# Patient Record
Sex: MALE | Race: OTHER | HISPANIC OR LATINO | URBAN - METROPOLITAN AREA
[De-identification: names, ages, dates, MRNs, and addresses within clinical notes are randomized per-mention and may not be internally consistent; named-entity substitution may affect disease eponyms.]

---

## 2019-09-03 VITALS — WEIGHT: 180.34 LBS | HEIGHT: 69 IN

## 2019-09-03 NOTE — H&P ADULT - NSHPREVIEWOFSYSTEMS_GEN_ALL_CORE
REVIEW OF SYSTEMS      General:	no complaints    Respiratory and Thorax: +SOLER  	  Cardiovascular: +LOC, denies CP, palpitations    Gastrointestinal:	no abd pain    Genitourinary:	no changes in urination    Musculoskeletal:	no myalgias, no weakness    Neurological:    +LOC    Hematology/Lymphatics:	no abnormal bleeding

## 2019-09-03 NOTE — H&P ADULT - ASSESSMENT
59 y/o Welsh SPEAKING male Resistant Historian with PMHx of HTN and HLD who presented to cardiologist Dr. Sebastian c/o LOC, subsequently underwent NST which showed inferior wall perfusion defect, no presenting  for cardiac catheterization with possible intervention if clinically indicated to r/o CAD.

## 2019-09-03 NOTE — H&P ADULT - HISTORY OF PRESENT ILLNESS
Confirm meds    Patient is a 57 y/o Citizen of [place] SPEAKING male with PMHx of HTN and HLD who presented to cardiologist Dr. Sebastian c/o SOLER    Patient denies CP, SOB, palpitations, syncope, PND/orthopnea or LE edema.    NST 08/09/19: EF 58%, moderate sized, moderate intensity partially reversible defect in the inferior wall suggestive of ischemia, frequent PVCs noted during stress test. Echo 06/17/19: EF 55-60% trace amount of AR    In light of pt's risk factors, CCS -- anginal symptoms, and abnormal stress test patient is referred for cardiac catheterization with possible intervention if clinically indicated to r/o CAD SKELETON  Confirm meds    Patient is a 59 y/o Azeri SPEAKING male with PMHx of HTN and HLD who presented to cardiologist Dr. Sebastian c/o SOLER    Patient denies CP, SOB, palpitations, syncope, PND/orthopnea or LE edema.    NST 08/09/19: EF 58%, moderate sized, moderate intensity partially reversible defect in the inferior wall suggestive of ischemia, frequent PVCs noted during stress test. Echo 06/17/19: EF 55-60% trace amount of AR    In light of pt's risk factors, CCS -- anginal symptoms, and abnormal stress test patient is referred for cardiac catheterization with possible intervention if clinically indicated to r/o CAD Hx obtained via  [id number] Char  Confirm meds    Patient is a 57 y/o Danish SPEAKING male Resistant Historian with PMHx of HTN and HLD who presented to cardiologist Dr. Sebastian c/o SOLER. Patient states he experiences SOLER upon walking 3-4 blocks improved with rest. Patient denies CP, palpitations, syncope, PND/orthopnea or LE edema. NST 08/09/19: EF 58%, moderate sized, moderate intensity partially reversible defect in the inferior wall suggestive of ischemia, frequent PVCs noted during stress test. Echo 06/17/19: EF 55-60% trace amount of AR    In light of pt's risk factors, CCS III anginal equiavelent symptoms, and abnormal stress test patient is referred for cardiac catheterization with possible intervention if clinically indicated to r/o CAD Hx obtained via  [id number] Char    Patient is a 57 y/o Icelandic SPEAKING male Resistant Historian with PMHx of HTN and HLD who presented to cardiologist Dr. Sebastian c/o DENYS after taking viagra. Patient states that he occasionally takes viagra prior to intercourse, but this is the first time he's had an adverse reaction. His wife is unaware of his taking viagra so he was not more forthecoming with any history. Patient does admit to some SOLER upon walking 3-4 blocks improved with rest. Patient denies CP, palpitations, syncope, PND/orthopnea or LE edema. NST 08/09/19: EF 58%, moderate sized, moderate intensity partially reversible defect in the inferior wall suggestive of ischemia, frequent PVCs noted during stress test. Echo 06/17/19: EF 55-60% trace amount of AR    In light of pt's risk factors, CCS III anginal equiavelent symptoms, and abnormal stress test patient is referred for cardiac catheterization with possible intervention if clinically indicated to r/o CAD Hx obtained via  [id number] Char    Patient is a 59 y/o German SPEAKING male Resistant Historian with PMHx of HTN and HLD who presented to cardiologist Dr. Sebastian c/o DENYS after taking viagra. Patient states that he occasionally takes viagra prior to intercourse, but this is the first time he's had an adverse reaction. His wife is unaware of his taking viagra so he was not more forthecoming with any history. Patient does admit to some SOLER upon walking 3-4 blocks improved with rest. Patient denies CP, palpitations, syncope, PND/orthopnea or LE edema. NST 08/09/19: EF 58%, moderate sized, moderate intensity partially reversible defect in the inferior wall suggestive of ischemia, frequent PVCs noted during stress test. Echo 06/17/19: EF 55-60% trace amount of AR.    In light of pt's risk factors, CCS III anginal equiavelent symptoms, and abnormal stress test patient is referred for cardiac catheterization with possible intervention if clinically indicated to r/o CAD

## 2019-09-04 ENCOUNTER — OUTPATIENT (OUTPATIENT)
Dept: OUTPATIENT SERVICES | Facility: HOSPITAL | Age: 59
LOS: 1 days | Discharge: NURSING, REHAB OR LONG-TERM CARE | End: 2019-09-04
Payer: COMMERCIAL

## 2019-09-04 LAB
ALBUMIN SERPL ELPH-MCNC: 4.4 G/DL — SIGNIFICANT CHANGE UP (ref 3.3–5)
ALP SERPL-CCNC: 82 U/L — SIGNIFICANT CHANGE UP (ref 40–120)
ALT FLD-CCNC: 53 U/L — HIGH (ref 10–45)
ANION GAP SERPL CALC-SCNC: 8 MMOL/L — SIGNIFICANT CHANGE UP (ref 5–17)
APTT BLD: 33 SEC — SIGNIFICANT CHANGE UP (ref 27.5–36.3)
AST SERPL-CCNC: 19 U/L — SIGNIFICANT CHANGE UP (ref 10–40)
BASOPHILS # BLD AUTO: 0.03 K/UL — SIGNIFICANT CHANGE UP (ref 0–0.2)
BASOPHILS NFR BLD AUTO: 0.4 % — SIGNIFICANT CHANGE UP (ref 0–2)
BILIRUB SERPL-MCNC: 0.4 MG/DL — SIGNIFICANT CHANGE UP (ref 0.2–1.2)
BUN SERPL-MCNC: 20 MG/DL — SIGNIFICANT CHANGE UP (ref 7–23)
CALCIUM SERPL-MCNC: 9.1 MG/DL — SIGNIFICANT CHANGE UP (ref 8.4–10.5)
CHLORIDE SERPL-SCNC: 106 MMOL/L — SIGNIFICANT CHANGE UP (ref 96–108)
CHOLEST SERPL-MCNC: 97 MG/DL — SIGNIFICANT CHANGE UP (ref 10–199)
CK MB CFR SERPL CALC: 3.6 NG/ML — SIGNIFICANT CHANGE UP (ref 0–6.7)
CK SERPL-CCNC: 160 U/L — SIGNIFICANT CHANGE UP (ref 30–200)
CO2 SERPL-SCNC: 25 MMOL/L — SIGNIFICANT CHANGE UP (ref 22–31)
CREAT SERPL-MCNC: 1.04 MG/DL — SIGNIFICANT CHANGE UP (ref 0.5–1.3)
CRP SERPL-MCNC: 0.03 MG/DL — SIGNIFICANT CHANGE UP (ref 0–0.4)
EOSINOPHIL # BLD AUTO: 0.14 K/UL — SIGNIFICANT CHANGE UP (ref 0–0.5)
EOSINOPHIL NFR BLD AUTO: 1.7 % — SIGNIFICANT CHANGE UP (ref 0–6)
GLUCOSE SERPL-MCNC: 103 MG/DL — HIGH (ref 70–99)
HBA1C BLD-MCNC: 5.5 % — SIGNIFICANT CHANGE UP (ref 4–5.6)
HCT VFR BLD CALC: 44.8 % — SIGNIFICANT CHANGE UP (ref 39–50)
HDLC SERPL-MCNC: 38 MG/DL — LOW
HGB BLD-MCNC: 14.7 G/DL — SIGNIFICANT CHANGE UP (ref 13–17)
IMM GRANULOCYTES NFR BLD AUTO: 0.2 % — SIGNIFICANT CHANGE UP (ref 0–1.5)
INR BLD: 1 — SIGNIFICANT CHANGE UP (ref 0.88–1.16)
LIPID PNL WITH DIRECT LDL SERPL: 41 MG/DL — SIGNIFICANT CHANGE UP
LYMPHOCYTES # BLD AUTO: 1.88 K/UL — SIGNIFICANT CHANGE UP (ref 1–3.3)
LYMPHOCYTES # BLD AUTO: 23 % — SIGNIFICANT CHANGE UP (ref 13–44)
MCHC RBC-ENTMCNC: 31.7 PG — SIGNIFICANT CHANGE UP (ref 27–34)
MCHC RBC-ENTMCNC: 32.8 GM/DL — SIGNIFICANT CHANGE UP (ref 32–36)
MCV RBC AUTO: 96.8 FL — SIGNIFICANT CHANGE UP (ref 80–100)
MONOCYTES # BLD AUTO: 0.72 K/UL — SIGNIFICANT CHANGE UP (ref 0–0.9)
MONOCYTES NFR BLD AUTO: 8.8 % — SIGNIFICANT CHANGE UP (ref 2–14)
NEUTROPHILS # BLD AUTO: 5.38 K/UL — SIGNIFICANT CHANGE UP (ref 1.8–7.4)
NEUTROPHILS NFR BLD AUTO: 65.9 % — SIGNIFICANT CHANGE UP (ref 43–77)
NRBC # BLD: 0 /100 WBCS — SIGNIFICANT CHANGE UP (ref 0–0)
PLATELET # BLD AUTO: 221 K/UL — SIGNIFICANT CHANGE UP (ref 150–400)
POTASSIUM SERPL-MCNC: 4 MMOL/L — SIGNIFICANT CHANGE UP (ref 3.5–5.3)
POTASSIUM SERPL-SCNC: 4 MMOL/L — SIGNIFICANT CHANGE UP (ref 3.5–5.3)
PROT SERPL-MCNC: 7.3 G/DL — SIGNIFICANT CHANGE UP (ref 6–8.3)
PROTHROM AB SERPL-ACNC: 11.3 SEC — SIGNIFICANT CHANGE UP (ref 10–12.9)
RBC # BLD: 4.63 M/UL — SIGNIFICANT CHANGE UP (ref 4.2–5.8)
RBC # FLD: 13.2 % — SIGNIFICANT CHANGE UP (ref 10.3–14.5)
SODIUM SERPL-SCNC: 139 MMOL/L — SIGNIFICANT CHANGE UP (ref 135–145)
TOTAL CHOLESTEROL/HDL RATIO MEASUREMENT: 2.6 RATIO — LOW (ref 3.4–9.6)
TRIGL SERPL-MCNC: 88 MG/DL — SIGNIFICANT CHANGE UP (ref 10–149)
WBC # BLD: 8.17 K/UL — SIGNIFICANT CHANGE UP (ref 3.8–10.5)
WBC # FLD AUTO: 8.17 K/UL — SIGNIFICANT CHANGE UP (ref 3.8–10.5)

## 2019-09-04 PROCEDURE — C1769: CPT

## 2019-09-04 PROCEDURE — 83036 HEMOGLOBIN GLYCOSYLATED A1C: CPT

## 2019-09-04 PROCEDURE — 80053 COMPREHEN METABOLIC PANEL: CPT

## 2019-09-04 PROCEDURE — 93005 ELECTROCARDIOGRAM TRACING: CPT

## 2019-09-04 PROCEDURE — C1894: CPT

## 2019-09-04 PROCEDURE — 82553 CREATINE MB FRACTION: CPT

## 2019-09-04 PROCEDURE — 85610 PROTHROMBIN TIME: CPT

## 2019-09-04 PROCEDURE — 93454 CORONARY ARTERY ANGIO S&I: CPT

## 2019-09-04 PROCEDURE — 86140 C-REACTIVE PROTEIN: CPT

## 2019-09-04 PROCEDURE — 93454 CORONARY ARTERY ANGIO S&I: CPT | Mod: 26

## 2019-09-04 PROCEDURE — 80061 LIPID PANEL: CPT

## 2019-09-04 PROCEDURE — 82550 ASSAY OF CK (CPK): CPT

## 2019-09-04 PROCEDURE — 85730 THROMBOPLASTIN TIME PARTIAL: CPT

## 2019-09-04 PROCEDURE — C1887: CPT

## 2019-09-04 PROCEDURE — 93010 ELECTROCARDIOGRAM REPORT: CPT

## 2019-09-04 PROCEDURE — 85025 COMPLETE CBC W/AUTO DIFF WBC: CPT

## 2019-09-04 RX ORDER — ASPIRIN/CALCIUM CARB/MAGNESIUM 324 MG
325 TABLET ORAL ONCE
Refills: 0 | Status: COMPLETED | OUTPATIENT
Start: 2019-09-04 | End: 2019-09-04

## 2019-09-04 RX ORDER — CHLORHEXIDINE GLUCONATE 213 G/1000ML
1 SOLUTION TOPICAL ONCE
Refills: 0 | Status: DISCONTINUED | OUTPATIENT
Start: 2019-09-04 | End: 2019-09-04

## 2019-09-04 RX ORDER — SODIUM CHLORIDE 9 MG/ML
1000 INJECTION INTRAMUSCULAR; INTRAVENOUS; SUBCUTANEOUS
Refills: 0 | Status: DISCONTINUED | OUTPATIENT
Start: 2019-09-04 | End: 2019-09-04

## 2019-09-04 RX ORDER — SODIUM CHLORIDE 9 MG/ML
500 INJECTION INTRAMUSCULAR; INTRAVENOUS; SUBCUTANEOUS
Refills: 0 | Status: DISCONTINUED | OUTPATIENT
Start: 2019-09-04 | End: 2019-09-04

## 2019-09-04 RX ORDER — CLOPIDOGREL BISULFATE 75 MG/1
600 TABLET, FILM COATED ORAL ONCE
Refills: 0 | Status: COMPLETED | OUTPATIENT
Start: 2019-09-04 | End: 2019-09-04

## 2019-09-04 RX ADMIN — Medication 325 MILLIGRAM(S): at 09:49

## 2019-09-04 RX ADMIN — SODIUM CHLORIDE 75 MILLILITER(S): 9 INJECTION INTRAMUSCULAR; INTRAVENOUS; SUBCUTANEOUS at 09:50

## 2019-09-04 RX ADMIN — CLOPIDOGREL BISULFATE 600 MILLIGRAM(S): 75 TABLET, FILM COATED ORAL at 09:49

## 2019-09-04 NOTE — PROGRESS NOTE ADULT - SUBJECTIVE AND OBJECTIVE BOX
Interventional Cardiology PA SDA Discharge Note    Patient without complaints. Ambulated and voided without difficulties    Afebrile, VSS    Ext:    		  		Right     Radial :  no  hematoma,   no  bleeding, dressing; C/D/I      Pulses:    intact RAD to baseline     A/P:      57 y/o Kyrgyz SPEAKING male Resistant Historian with PMHx of HTN and HLD who presented to cardiologist Dr. Sebastian c/o CCS III anginal equiavelent symptoms, and abnormal stress test patient is referred for cardiac catheterization with possible intervention if clinically indicated to r/o CAD.  Patient is s/p diagnostic cardiac catheterization revealing normal coronary arteries.  Patient stable for discharge home and will follow up for risk factor modification.       1.	Stable for discharge as per attending  __Romulo_______ after bed rest, pt voids, groin/wrist stable and 30 minutes of ambulation.  2.	Follow-up with PMD/Cardiologist __Romulo_________ in 1-2 weeks  3.	Discharged forms signed and copies in chart

## 2024-03-13 PROBLEM — I10 ESSENTIAL (PRIMARY) HYPERTENSION: Chronic | Status: ACTIVE | Noted: 2019-09-03

## 2024-03-13 PROBLEM — E78.5 HYPERLIPIDEMIA, UNSPECIFIED: Chronic | Status: ACTIVE | Noted: 2019-09-03

## 2024-03-19 PROBLEM — Z00.00 ENCOUNTER FOR PREVENTIVE HEALTH EXAMINATION: Status: ACTIVE | Noted: 2024-03-19

## 2024-03-21 ENCOUNTER — APPOINTMENT (OUTPATIENT)
Dept: SURGERY | Facility: CLINIC | Age: 64
End: 2024-03-21
Payer: COMMERCIAL

## 2024-03-21 VITALS — WEIGHT: 185 LBS | HEIGHT: 66 IN | HEART RATE: 84 BPM | OXYGEN SATURATION: 97 % | BODY MASS INDEX: 29.73 KG/M2

## 2024-03-21 DIAGNOSIS — Z80.1 FAMILY HISTORY OF MALIGNANT NEOPLASM OF TRACHEA, BRONCHUS AND LUNG: ICD-10-CM

## 2024-03-21 DIAGNOSIS — Z78.9 OTHER SPECIFIED HEALTH STATUS: ICD-10-CM

## 2024-03-21 PROCEDURE — 99203 OFFICE O/P NEW LOW 30 MIN: CPT

## 2024-03-21 NOTE — PLAN
[FreeTextEntry1] : Mr. DHAVAL TARANGO  was informed of significance of findings. All options, risks and benefits were discussed at length. Informed consent for excision of, left axillary mass; and potential risks, benefits and alternatives (surgical options were discussed including non-surgical options or the option of no surgery) to the planned surgery were discussed in depth. All surgical options were discussed including non-surgical treatments. The patient wishes to proceed with surgery. We will plan for surgery on at the next available date, pending any required insurance pre-certification or pre-approval. He agrees to obtain any necessary pre-operative evaluations and testing prior to surgery. Patient advised to seek immediate medical attention with any acute change in symptoms or with the development of any new or worsening symptoms. Patient's questions and concerns addressed to patient's satisfaction, and patient verbalized an understanding of the information discussed.

## 2024-03-21 NOTE — PHYSICAL EXAM
[Oriented to Place] : oriented to place [Oriented to Person] : oriented to person [Alert] : alert [Oriented to Time] : oriented to time [Calm] : calm [de-identified] : A/Ox3; NAD. appears comfortable [de-identified] : EOMI; sclera anicteric. [de-identified] : no cervical lymphadenopathy  [de-identified] : airway patent, no use of accessory muscles [de-identified] : abd is soft, NT/ND [de-identified] : palpable soft tissue mass, non tender, left axilla, approx 5 x5 cm in size

## 2024-03-21 NOTE — HISTORY OF PRESENT ILLNESS
[de-identified] : Mr. TARANGO is a 63 year y/o M here for consult visit w cc of having left axillary mass.  Patient had US (R) 02/21/24, which had showed no definite abnormality, but an increase in size, surgical consult recommended with CT or MRI.  MR Axilla was done 03/14/24.. pending.   Patient states the lump has been there for at least 2 years now and has increased in size.

## 2024-03-21 NOTE — ASSESSMENT
[FreeTextEntry1] : IMP: 62 yo M with soft, palpable mass of the left axilla, approx 5 x5 cm in size.

## 2024-03-21 NOTE — CONSULT LETTER
[Dear  ___] : Dear  [unfilled], [Consult Letter:] : I had the pleasure of evaluating your patient, [unfilled]. [Consult Closing:] : Thank you very much for allowing me to participate in the care of this patient.  If you have any questions, please do not hesitate to contact me. [Sincerely,] : Sincerely, [FreeTextEntry3] : Jarvis Reynolds MD

## 2024-03-21 NOTE — DATA REVIEWED
[FreeTextEntry1] : Patient: DHAVAL TARANGO YOB: 1960 Phone: [phone] MRN: [id number] Acc: [id number] Date of Exam: 02- EXAM:  ULTRASOUND LEFT AXILLA HISTORY:  Palpable finding left axilla. TECHNIQUE:  Ultrasound of the left axilla was performed. COMPARISON:  None  FINDINGS:  In the left axilla, in the area of palpable finding, 13 cm from the nipple, there are two lymph nodes with thin cortices. No definite evidence of suspicious mass is detected.  IMPRESSION: No definite sonographic abnormality, however, given increased size, surgical consultation is recommended. Recommended further imaging such as CT or MRI.

## 2024-03-22 LAB
ALBUMIN SERPL ELPH-MCNC: 4.7 G/DL
ALP BLD-CCNC: 82 U/L
ALT SERPL-CCNC: 26 U/L
ANION GAP SERPL CALC-SCNC: 13 MMOL/L
AST SERPL-CCNC: 22 U/L
BASOPHILS # BLD AUTO: 0.02 K/UL
BASOPHILS NFR BLD AUTO: 0.3 %
BILIRUB SERPL-MCNC: 0.4 MG/DL
BUN SERPL-MCNC: 15 MG/DL
CALCIUM SERPL-MCNC: 9.7 MG/DL
CHLORIDE SERPL-SCNC: 104 MMOL/L
CO2 SERPL-SCNC: 24 MMOL/L
CREAT SERPL-MCNC: 0.96 MG/DL
EGFR: 89 ML/MIN/1.73M2
EOSINOPHIL # BLD AUTO: 0.04 K/UL
EOSINOPHIL NFR BLD AUTO: 0.6 %
GLUCOSE SERPL-MCNC: 98 MG/DL
HCT VFR BLD CALC: 46.3 %
HGB BLD-MCNC: 16.1 G/DL
IMM GRANULOCYTES NFR BLD AUTO: 0.5 %
LYMPHOCYTES # BLD AUTO: 1.85 K/UL
LYMPHOCYTES NFR BLD AUTO: 27.9 %
MAN DIFF?: NORMAL
MCHC RBC-ENTMCNC: 31.8 PG
MCHC RBC-ENTMCNC: 34.8 GM/DL
MCV RBC AUTO: 91.5 FL
MONOCYTES # BLD AUTO: 0.64 K/UL
MONOCYTES NFR BLD AUTO: 9.6 %
NEUTROPHILS # BLD AUTO: 4.06 K/UL
NEUTROPHILS NFR BLD AUTO: 61.1 %
PLATELET # BLD AUTO: 251 K/UL
POTASSIUM SERPL-SCNC: 4.3 MMOL/L
PROT SERPL-MCNC: 7.5 G/DL
RBC # BLD: 5.06 M/UL
RBC # FLD: 14.3 %
SODIUM SERPL-SCNC: 140 MMOL/L
WBC # FLD AUTO: 6.64 K/UL

## 2024-03-26 ENCOUNTER — OUTPATIENT (OUTPATIENT)
Dept: OUTPATIENT SERVICES | Facility: HOSPITAL | Age: 64
LOS: 1 days | End: 2024-03-26
Payer: COMMERCIAL

## 2024-03-26 VITALS
WEIGHT: 190.04 LBS | SYSTOLIC BLOOD PRESSURE: 148 MMHG | HEIGHT: 69 IN | OXYGEN SATURATION: 99 % | TEMPERATURE: 98 F | RESPIRATION RATE: 18 BRPM | HEART RATE: 83 BPM | DIASTOLIC BLOOD PRESSURE: 83 MMHG

## 2024-03-26 DIAGNOSIS — Z41.8 ENCOUNTER FOR OTHER PROCEDURES FOR PURPOSES OTHER THAN REMEDYING HEALTH STATE: Chronic | ICD-10-CM

## 2024-03-26 DIAGNOSIS — Z86.69 PERSONAL HISTORY OF OTHER DISEASES OF THE NERVOUS SYSTEM AND SENSE ORGANS: Chronic | ICD-10-CM

## 2024-03-26 DIAGNOSIS — Z98.49 CATARACT EXTRACTION STATUS, UNSPECIFIED EYE: Chronic | ICD-10-CM

## 2024-03-26 DIAGNOSIS — Z87.438 PERSONAL HISTORY OF OTHER DISEASES OF MALE GENITAL ORGANS: Chronic | ICD-10-CM

## 2024-03-26 DIAGNOSIS — R22.32 LOCALIZED SWELLING, MASS AND LUMP, LEFT UPPER LIMB: ICD-10-CM

## 2024-03-26 DIAGNOSIS — Z01.818 ENCOUNTER FOR OTHER PREPROCEDURAL EXAMINATION: ICD-10-CM

## 2024-03-26 DIAGNOSIS — C43.9 MALIGNANT MELANOMA OF SKIN, UNSPECIFIED: ICD-10-CM

## 2024-03-26 DIAGNOSIS — Z87.2 PERSONAL HISTORY OF DISEASES OF THE SKIN AND SUBCUTANEOUS TISSUE: Chronic | ICD-10-CM

## 2024-03-26 DIAGNOSIS — Z98.890 OTHER SPECIFIED POSTPROCEDURAL STATES: Chronic | ICD-10-CM

## 2024-03-26 NOTE — H&P PST ADULT - HEMATOLOGY/LYMPHATICS
"I received a letter from this patient regarding his disability forms. I reviewed this with Dr. Naveen Maier, as these forms require an \"attending physician\" certification.  Dr. Maier states he is not evaluating for disability in his role here and patient may either continue care at [facility] for this (with Dr. Garcia's replacement), speak to his PCP about this, or obtain a functional capacity exam to assist in the completion of this form.  If he wants us to mail back the form or fax back I still have it at my desk.    " negative

## 2024-03-26 NOTE — H&P PST ADULT - HISTORY OF PRESENT ILLNESS
63year old male with pmhx of left eye cataract, left eye retinal detachment, hypertension, hyperlipidemia (dietary/lifestyle modifications), GERD, alopecia and erectile dysfunction presents with c/o left axillary mass that progressively grown larger in size x 2years. Patient is here today for presurgical testing for scheduled left axillary excision of mass on 4/3/2024

## 2024-03-26 NOTE — H&P PST ADULT - NSICDXPASTMEDICALHX_GEN_ALL_CORE_FT
PAST MEDICAL HISTORY:  History of cataract     History of retinal detachment     Hyperlipidemia     Hypertension

## 2024-03-26 NOTE — H&P PST ADULT - NSICDXPROCEDURE_GEN_ALL_CORE_FT
PROCEDURES:  Excision of soft tissue tumor of upper arm, less than 3 cm 26-Mar-2024 07:54:35  Yani Rowell

## 2024-03-26 NOTE — H&P PST ADULT - NSICDXPASTSURGICALHX_GEN_ALL_CORE_FT
PAST SURGICAL HISTORY:  Encounter for elective hair transplant     H/O retinal detachment     History of alopecia     History of erectile dysfunction     S/P cataract extraction     S/P LASIK surgery of both eyes

## 2024-03-26 NOTE — H&P PST ADULT - PROBLEM SELECTOR PLAN 1
Patient is scheduled for left axillary excision of mass on 4/3/2024  Written and oral preoperative instructions given to patient with understanding verbalized.   Instructions given to include using 4% chlorhexidine wash as directed starting 3days before day of surgery (inclusive of day of surgery)  Maintaining NPO status post midnight day before surgery  Stopping aspirin, NSAIDs, herbs, vitamins 7days before surgery   Patient is to expect a phone call day before surgery between the hours of 430- 630pm giving arrival time for surgery day.    Patient today with STOP bang Score of 2, Low risk for NOHEMI

## 2024-03-27 PROCEDURE — T1013: CPT

## 2024-03-27 PROCEDURE — G0463: CPT

## 2024-04-02 ENCOUNTER — TRANSCRIPTION ENCOUNTER (OUTPATIENT)
Age: 64
End: 2024-04-02

## 2024-04-03 ENCOUNTER — OUTPATIENT (OUTPATIENT)
Dept: OUTPATIENT SERVICES | Facility: HOSPITAL | Age: 64
LOS: 1 days | End: 2024-04-03
Payer: COMMERCIAL

## 2024-04-03 ENCOUNTER — RESULT REVIEW (OUTPATIENT)
Age: 64
End: 2024-04-03

## 2024-04-03 ENCOUNTER — APPOINTMENT (OUTPATIENT)
Dept: SURGERY | Facility: HOSPITAL | Age: 64
End: 2024-04-03

## 2024-04-03 ENCOUNTER — TRANSCRIPTION ENCOUNTER (OUTPATIENT)
Age: 64
End: 2024-04-03

## 2024-04-03 VITALS
DIASTOLIC BLOOD PRESSURE: 75 MMHG | TEMPERATURE: 98 F | WEIGHT: 190.04 LBS | HEART RATE: 80 BPM | RESPIRATION RATE: 16 BRPM | OXYGEN SATURATION: 98 % | HEIGHT: 69 IN | SYSTOLIC BLOOD PRESSURE: 125 MMHG

## 2024-04-03 VITALS
HEART RATE: 74 BPM | OXYGEN SATURATION: 98 % | DIASTOLIC BLOOD PRESSURE: 65 MMHG | SYSTOLIC BLOOD PRESSURE: 105 MMHG | RESPIRATION RATE: 14 BRPM | TEMPERATURE: 97 F

## 2024-04-03 DIAGNOSIS — Z41.8 ENCOUNTER FOR OTHER PROCEDURES FOR PURPOSES OTHER THAN REMEDYING HEALTH STATE: Chronic | ICD-10-CM

## 2024-04-03 DIAGNOSIS — Z86.69 PERSONAL HISTORY OF OTHER DISEASES OF THE NERVOUS SYSTEM AND SENSE ORGANS: Chronic | ICD-10-CM

## 2024-04-03 DIAGNOSIS — C43.9 MALIGNANT MELANOMA OF SKIN, UNSPECIFIED: ICD-10-CM

## 2024-04-03 DIAGNOSIS — Z98.890 OTHER SPECIFIED POSTPROCEDURAL STATES: Chronic | ICD-10-CM

## 2024-04-03 DIAGNOSIS — Z98.49 CATARACT EXTRACTION STATUS, UNSPECIFIED EYE: Chronic | ICD-10-CM

## 2024-04-03 DIAGNOSIS — Z87.2 PERSONAL HISTORY OF DISEASES OF THE SKIN AND SUBCUTANEOUS TISSUE: Chronic | ICD-10-CM

## 2024-04-03 DIAGNOSIS — R22.32 LOCALIZED SWELLING, MASS AND LUMP, LEFT UPPER LIMB: ICD-10-CM

## 2024-04-03 DIAGNOSIS — Z87.438 PERSONAL HISTORY OF OTHER DISEASES OF MALE GENITAL ORGANS: Chronic | ICD-10-CM

## 2024-04-03 PROCEDURE — 38500 BIOPSY/REMOVAL LYMPH NODES: CPT | Mod: LT

## 2024-04-03 PROCEDURE — ZZZZZ: CPT

## 2024-04-03 PROCEDURE — 88305 TISSUE EXAM BY PATHOLOGIST: CPT

## 2024-04-03 PROCEDURE — 21556 EXC NECK TUM DEEP < 5 CM: CPT

## 2024-04-03 PROCEDURE — 88305 TISSUE EXAM BY PATHOLOGIST: CPT | Mod: 26

## 2024-04-03 RX ORDER — FENTANYL CITRATE 50 UG/ML
25 INJECTION INTRAVENOUS
Refills: 0 | Status: DISCONTINUED | OUTPATIENT
Start: 2024-04-03 | End: 2024-04-03

## 2024-04-03 RX ORDER — FENTANYL CITRATE 50 UG/ML
50 INJECTION INTRAVENOUS
Refills: 0 | Status: DISCONTINUED | OUTPATIENT
Start: 2024-04-03 | End: 2024-04-03

## 2024-04-03 RX ORDER — PANTOPRAZOLE SODIUM 20 MG/1
1 TABLET, DELAYED RELEASE ORAL
Qty: 0 | Refills: 0 | DISCHARGE

## 2024-04-03 RX ORDER — SODIUM CHLORIDE 9 MG/ML
3 INJECTION INTRAMUSCULAR; INTRAVENOUS; SUBCUTANEOUS EVERY 8 HOURS
Refills: 0 | Status: DISCONTINUED | OUTPATIENT
Start: 2024-04-03 | End: 2024-04-03

## 2024-04-03 RX ORDER — METOPROLOL TARTRATE 50 MG
1 TABLET ORAL
Qty: 0 | Refills: 0 | DISCHARGE

## 2024-04-03 RX ORDER — ASPIRIN/CALCIUM CARB/MAGNESIUM 324 MG
1 TABLET ORAL
Qty: 0 | Refills: 0 | DISCHARGE

## 2024-04-03 RX ORDER — LISINOPRIL 2.5 MG/1
1 TABLET ORAL
Qty: 0 | Refills: 0 | DISCHARGE

## 2024-04-03 RX ORDER — SODIUM CHLORIDE 9 MG/ML
1000 INJECTION, SOLUTION INTRAVENOUS
Refills: 0 | Status: DISCONTINUED | OUTPATIENT
Start: 2024-04-03 | End: 2024-04-03

## 2024-04-03 RX ORDER — ATORVASTATIN CALCIUM 80 MG/1
1 TABLET, FILM COATED ORAL
Qty: 0 | Refills: 0 | DISCHARGE

## 2024-04-03 RX ORDER — ONDANSETRON 8 MG/1
4 TABLET, FILM COATED ORAL ONCE
Refills: 0 | Status: DISCONTINUED | OUTPATIENT
Start: 2024-04-03 | End: 2024-04-03

## 2024-04-03 NOTE — ASU PATIENT PROFILE, ADULT - NSICDXPASTMEDICALHX_GEN_ALL_CORE_FT
PAST MEDICAL HISTORY:  History of cataract     History of retinal detachment     Hyperlipidemia     Hypertension      Yes

## 2024-04-03 NOTE — ASU PREOP CHECKLIST - 1.
pt proffered language is  Cymraes, pt speaks and understands simple english.  services offered, pt declined at this time. pt statif needed, my daughter can translate. However would like interpretor services when speaking to doctors

## 2024-04-03 NOTE — ASU DISCHARGE PLAN (ADULT/PEDIATRIC) - NS MD DC FALL RISK RISK
For information on Fall & Injury Prevention, visit: [email or web address] OR  [email or web address] OR  https://www.cdc.gov/steadi/patient.html

## 2024-04-03 NOTE — ASU DISCHARGE PLAN (ADULT/PEDIATRIC) - ASU DC SPECIAL INSTRUCTIONSFT
Please follow-up with your surgeon in 1 week. Drink plenty of fluids and rest as needed. Call for any fever over 101, nausea, vomiting, severe pain.  DIET   You may resume your regular diet as normal.     SURGICAL SITES   Remove outer dressing in 2 days. You may shower 48 hours post-operatively but do not bathe or soak in the water for 1-2 weeks; pat dry. If you notice any signs of surgical site infection (ie. redness, swelling, pain, pus drainage), please seek medical care immediately.    ACTIVITY   Do not lift anything heavier than 10 pounds for 2 weeks (2-3 months for hernia, no exercise for 2 weeks) and avoid strenuous activity for 4-6 weeks.     PAIN CONTROL   You may take Motrin 600mg (with food) or Tylenol 650mg as needed for mild pain. Stagger one medication 3 hours after the other for maximum pain control. Please refer to medical records/ progress notes for in depth hospital course. Discharge plan discussed and agreed with attending.

## 2024-04-03 NOTE — ASU DISCHARGE PLAN (ADULT/PEDIATRIC) - CARE PROVIDER_API CALL
Jarvis Reynolds.  Surgery  [address]  [place] [address]  Phone: [phone]  Fax: [phone]  Follow Up Time: 1 week

## 2024-04-03 NOTE — ASU PATIENT PROFILE, ADULT - NSICDXPASTSURGICALHX_GEN_ALL_CORE_FT
PAST SURGICAL HISTORY:  Encounter for elective hair transplant     H/O retinal detachment     History of alopecia     History of erectile dysfunction     S/P cataract extraction     S/P LASIK surgery of both eyes      PAST SURGICAL HISTORY:  Encounter for elective hair transplant     H/O retinal detachment     History of alopecia     S/P cataract extraction     S/P LASIK surgery of both eyes

## 2024-04-03 NOTE — ASU PATIENT PROFILE, ADULT - LANGUAGE ASSISTANCE NEEDED
Yes-Patient/Caregiver accepts free interpretation services... pt would like interpretor services when speaking to the doctors and Kitt[place] consents/No-Patient/Caregiver offered and refused free interpretation services.

## 2024-04-03 NOTE — PACU DISCHARGE NOTE - NSPTMEETSDISCHCRITERIADT_GEN_A_CORE
03-Apr-2024 10:20 Opzelura Counseling:  I discussed with the patient the risks of Opzelura including but not limited to nasopharngitis, bronchitis, ear infection, eosinophila, hives, diarrhea, folliculitis, tonsillitis, and rhinorrhea.  Taken orally, this medication has been linked to serious infections; higher rate of mortality; malignancy and lymphoproliferative disorders; major adverse cardiovascular events; thrombosis; thrombocytopenia, anemia, and neutropenia; and lipid elevations.

## 2024-04-04 PROBLEM — Z86.69 PERSONAL HISTORY OF OTHER DISEASES OF THE NERVOUS SYSTEM AND SENSE ORGANS: Chronic | Status: ACTIVE | Noted: 2024-03-26

## 2024-04-10 LAB — SURGICAL PATHOLOGY STUDY: SIGNIFICANT CHANGE UP

## 2024-04-11 ENCOUNTER — APPOINTMENT (OUTPATIENT)
Dept: SURGERY | Facility: CLINIC | Age: 64
End: 2024-04-11
Payer: COMMERCIAL

## 2024-04-11 PROCEDURE — 99024 POSTOP FOLLOW-UP VISIT: CPT

## 2024-04-11 RX ORDER — AMOXICILLIN AND CLAVULANATE POTASSIUM 875; 125 MG/1; MG/1
875-125 TABLET, COATED ORAL
Qty: 14 | Refills: 0 | Status: ACTIVE | COMMUNITY
Start: 2024-04-11 | End: 1900-01-01

## 2024-04-11 NOTE — REASON FOR VISIT
[Post Op: _________] : a [unfilled] post op visit [[employer] ] : provided by [employer]   [FreeTextEntry1] : S/P excision of L axillary mass 04/03/24 [Interpreters_IDNumber] : [id number] [Interpreters_FullName] : JERMAINE [TWNoteComboBox1] : Macedonian

## 2024-04-11 NOTE — ASSESSMENT
[FreeTextEntry1] : IMP: 64 yo M with soft, palpable mass of the left axilla, approx 5 x5 cm in size, here today for postop, S/P excision of L axillary mass 04/03/24.  FINAL PATH-- benign.

## 2024-04-11 NOTE — PHYSICAL EXAM
[No Rash or Lesion] : No rash or lesion [Alert] : alert [Oriented to Person] : oriented to person [Oriented to Place] : oriented to place [Oriented to Time] : oriented to time [Calm] : calm [de-identified] : A/Ox3; NAD. appears comfortable [de-identified] : airway patent, no use of accessory muscles [de-identified] : incision site is healing well w some fluid formation , no evidence of acute inflammation or infection

## 2024-04-11 NOTE — HISTORY OF PRESENT ILLNESS
[de-identified] : Mr. TARANGO is a 63 year y/o M who initially presented to the office w cc of having left axillary mass.  Patient had US ([facility]) 02/21/24, which had showed no definite abnormality, but an increase in size, surgical consult recommended with CT or MRI.  MR Axilla was done 03/14/24. Patient states the lump has been there for at least 2 years now and has increased in size.   Here today for postop, S/P excision of L axillary mass 04/03/24 FINAL PATH: benign, c/w benign lipomatous lymph node.

## 2024-04-11 NOTE — PLAN
[FreeTextEntry1] : puncture/fine needle aspiration to L axilla performed, with use of LA 1% > 50 cc of  contents had been aspirated. patient tolerated procedure well. RTO in 2 weeks   Patient's questions and concerns addressed to their satisfaction, and patient verbalized an understanding of the information discussed.

## 2024-04-15 ENCOUNTER — APPOINTMENT (OUTPATIENT)
Dept: SURGERY | Facility: CLINIC | Age: 64
End: 2024-04-15
Payer: COMMERCIAL

## 2024-04-15 PROCEDURE — 99024 POSTOP FOLLOW-UP VISIT: CPT

## 2024-04-15 NOTE — ASSESSMENT
[FreeTextEntry1] : IMP: 62 yo M with soft, palpable mass of the left axilla, approx 5 x5 cm in size, here today for postop, S/P excision of L axillary mass 04/03/24.  FINAL PATH-- benign.

## 2024-04-15 NOTE — PHYSICAL EXAM
[No Rash or Lesion] : No rash or lesion [Alert] : alert [Oriented to Person] : oriented to person [Oriented to Place] : oriented to place [Oriented to Time] : oriented to time [Calm] : calm [de-identified] : A/Ox3; NAD. appears comfortable [de-identified] : airway patent, no use of accessory muscles [de-identified] : well healed incision,  +swelling c/w lymphatic fluid, no evidence of acute inflammation or infection

## 2024-04-15 NOTE — PLAN
[FreeTextEntry1] : puncture/fine needle aspiration of fluid performed, with use of LA 1%. >80 cc had been aspirated. patient tolerated procedure well. complete course of PO Augmentin  dressings placed   RTO 2 weeks  Patient's questions and concerns addressed to their satisfaction, and patient verbalized an understanding of the information discussed.

## 2024-04-15 NOTE — HISTORY OF PRESENT ILLNESS
[de-identified] : Mr. TARANGO is a 63 year y/o M who initially presented to the office w cc of having left axillary mass.  Patient had US (R) 02/21/24, which had showed no definite abnormality, but an increase in size, surgical consult recommended with CT or MRI.  MR Axilla was done 03/14/24. Patient states the lump has been there for at least 2 years now and has increased in size.   Here today for postop, S/P excision of L axillary mass 04/03/24 FINAL PATH: benign, c/w benign lipomatous lymph node.  Patient states the swelling to the L axillary has increased again and he has some discomfort to the area.

## 2024-04-15 NOTE — CONSULT LETTER
[Dear  ___] : Dear  [unfilled], [Consult Letter:] : I had the pleasure of evaluating your patient, [unfilled]. [Sincerely,] : Sincerely, [Consult Closing:] : Thank you very much for allowing me to participate in the care of this patient.  If you have any questions, please do not hesitate to contact me. [FreeTextEntry3] : Jarvis Reynolds MD

## 2024-04-18 ENCOUNTER — APPOINTMENT (OUTPATIENT)
Dept: SURGERY | Facility: CLINIC | Age: 64
End: 2024-04-18

## 2024-04-25 ENCOUNTER — APPOINTMENT (OUTPATIENT)
Dept: SURGERY | Facility: CLINIC | Age: 64
End: 2024-04-25
Payer: COMMERCIAL

## 2024-04-25 PROCEDURE — 99024 POSTOP FOLLOW-UP VISIT: CPT

## 2024-04-25 NOTE — REASON FOR VISIT
[Post Op: _________] : a [unfilled] post op visit [FreeTextEntry1] : s/p excision of mass left axillary mass

## 2024-04-25 NOTE — HISTORY OF PRESENT ILLNESS
[de-identified] : Mr. TARANGO is a 63 year y/o M who initially presented to the office w cc of having left axillary mass.  Patient had US ([facility]) 02/21/24, which had showed no definite abnormality, but an increase in size, surgical consult recommended with CT or MRI.  MR Axilla was done 03/14/24. Patient states the lump has been there for at least 2 years now and has increased in size.   Here today for postop, S/P excision of L axillary mass 04/03/24 FINAL PATH: benign, c/w benign lipomatous lymph node.

## 2024-05-16 ENCOUNTER — APPOINTMENT (OUTPATIENT)
Dept: SURGERY | Facility: CLINIC | Age: 64
End: 2024-05-16
Payer: COMMERCIAL

## 2024-05-16 DIAGNOSIS — R22.32 LOCALIZED SWELLING, MASS AND LUMP, LEFT UPPER LIMB: ICD-10-CM

## 2024-05-16 PROCEDURE — 99024 POSTOP FOLLOW-UP VISIT: CPT

## 2024-05-16 NOTE — PHYSICAL EXAM
[No Rash or Lesion] : No rash or lesion [Alert] : alert [Oriented to Person] : oriented to person [Oriented to Place] : oriented to place [Oriented to Time] : oriented to time [Calm] : calm [de-identified] : A/Ox3; NAD. appears comfortable [de-identified] : airway patent, no use of accessory muscles [de-identified] : well healed incision site,  some minimal soft tissue swelling, no fluid collection, no evidence of acute inflammation or infection

## 2024-05-16 NOTE — PLAN
[FreeTextEntry1] : pt reassured wound will take additional 4-6 weeks to heal w improvement in soft tissue swelling  Warning signs, follow up, and restrictions were discussed with the patient.  patient will follow up if needed.  Patient's questions and concerns addressed to their satisfaction, and patient verbalized an understanding of the information discussed.

## 2024-05-16 NOTE — REASON FOR VISIT
[Post Op: _________] : a [unfilled] post op visit [FreeTextEntry1] : S/P excision of L axillary mass 04/03/24

## 2024-05-16 NOTE — HISTORY OF PRESENT ILLNESS
[de-identified] : Mr. TARANGO is a 63 year y/o M who initially presented to the office w cc of having left axillary mass.  Patient had US (R) 02/21/24, which had showed no definite abnormality, but an increase in size, surgical consult recommended with CT or MRI.  MR Axilla was done 03/14/24. Patient states the lump has been there for at least 2 years now and has increased in size.   Here today for wound check, S/P excision of L axillary mass 04/03/24 FINAL PATH: benign, c/w benign lipomatous lymph node.  Pt notes much less swelling /fluid collection to the area. No pain. No other reported complaints.

## 2024-07-11 ENCOUNTER — APPOINTMENT (OUTPATIENT)
Dept: SURGERY | Facility: CLINIC | Age: 64
End: 2024-07-11
Payer: COMMERCIAL

## 2024-07-11 DIAGNOSIS — R22.32 LOCALIZED SWELLING, MASS AND LUMP, LEFT UPPER LIMB: ICD-10-CM

## 2024-07-11 PROCEDURE — 99213 OFFICE O/P EST LOW 20 MIN: CPT

## (undated) DEVICE — WARMING BLANKET FULL ADULT

## (undated) DEVICE — TUBING SUCTION NONCONDUCTIVE 6MM X 12FT

## (undated) DEVICE — DRAPE LAPAROTOMY TRANSVERSE

## (undated) DEVICE — PACK MINOR NO DRAPE

## (undated) DEVICE — GLV 7 PROTEXIS (WHITE)

## (undated) DEVICE — POSITIONER STRAP ARMBOARD VELCRO TS-30

## (undated) DEVICE — CANISTER DISPOSABLE THIN WALL 3000CC

## (undated) DEVICE — SOL IRR POUR NS 0.9% 500ML

## (undated) DEVICE — SUT VICRYL 3-0 27" SH UNDYED

## (undated) DEVICE — ELCTR GROUNDING PAD ADULT COVIDIEN

## (undated) DEVICE — DRSG STERISTRIPS 0.25 X 3"

## (undated) DEVICE — VENODYNE/SCD SLEEVE CALF MEDIUM

## (undated) DEVICE — SUT MONOCRYL 4-0 27" PS-2 UNDYED